# Patient Record
Sex: MALE | Race: WHITE | NOT HISPANIC OR LATINO | Employment: FULL TIME | URBAN - METROPOLITAN AREA
[De-identification: names, ages, dates, MRNs, and addresses within clinical notes are randomized per-mention and may not be internally consistent; named-entity substitution may affect disease eponyms.]

---

## 2024-02-20 ENCOUNTER — HOSPITAL ENCOUNTER (EMERGENCY)
Facility: HOSPITAL | Age: 39
Discharge: HOME | End: 2024-02-20
Attending: STUDENT IN AN ORGANIZED HEALTH CARE EDUCATION/TRAINING PROGRAM
Payer: COMMERCIAL

## 2024-02-20 ENCOUNTER — APPOINTMENT (OUTPATIENT)
Dept: RADIOLOGY | Facility: HOSPITAL | Age: 39
End: 2024-02-20
Payer: COMMERCIAL

## 2024-02-20 ENCOUNTER — APPOINTMENT (OUTPATIENT)
Dept: CARDIOLOGY | Facility: HOSPITAL | Age: 39
End: 2024-02-20
Payer: COMMERCIAL

## 2024-02-20 VITALS
SYSTOLIC BLOOD PRESSURE: 147 MMHG | RESPIRATION RATE: 18 BRPM | HEIGHT: 71 IN | HEART RATE: 76 BPM | BODY MASS INDEX: 28 KG/M2 | TEMPERATURE: 98.2 F | WEIGHT: 200 LBS | DIASTOLIC BLOOD PRESSURE: 96 MMHG | OXYGEN SATURATION: 98 %

## 2024-02-20 DIAGNOSIS — R42 DIZZINESS: Primary | ICD-10-CM

## 2024-02-20 LAB
ALBUMIN SERPL BCP-MCNC: 4.5 G/DL (ref 3.4–5)
ALP SERPL-CCNC: 83 U/L (ref 33–120)
ALT SERPL W P-5'-P-CCNC: 45 U/L (ref 10–52)
ANION GAP SERPL CALC-SCNC: 14 MMOL/L (ref 10–20)
AST SERPL W P-5'-P-CCNC: 20 U/L (ref 9–39)
BASOPHILS # BLD AUTO: 0.03 X10*3/UL (ref 0–0.1)
BASOPHILS NFR BLD AUTO: 0.4 %
BILIRUB SERPL-MCNC: 0.6 MG/DL (ref 0–1.2)
BUN SERPL-MCNC: 17 MG/DL (ref 6–23)
CALCIUM SERPL-MCNC: 10 MG/DL (ref 8.6–10.3)
CARDIAC TROPONIN I PNL SERPL HS: 3 NG/L (ref 0–20)
CHLORIDE SERPL-SCNC: 101 MMOL/L (ref 98–107)
CO2 SERPL-SCNC: 25 MMOL/L (ref 21–32)
CREAT SERPL-MCNC: 0.98 MG/DL (ref 0.5–1.3)
EGFRCR SERPLBLD CKD-EPI 2021: >90 ML/MIN/1.73M*2
EOSINOPHIL # BLD AUTO: 0.04 X10*3/UL (ref 0–0.7)
EOSINOPHIL NFR BLD AUTO: 0.5 %
ERYTHROCYTE [DISTWIDTH] IN BLOOD BY AUTOMATED COUNT: 12.4 % (ref 11.5–14.5)
GLUCOSE SERPL-MCNC: 114 MG/DL (ref 74–99)
HCT VFR BLD AUTO: 49.7 % (ref 41–52)
HGB BLD-MCNC: 17.4 G/DL (ref 13.5–17.5)
IMM GRANULOCYTES # BLD AUTO: 0.03 X10*3/UL (ref 0–0.7)
IMM GRANULOCYTES NFR BLD AUTO: 0.4 % (ref 0–0.9)
LYMPHOCYTES # BLD AUTO: 2.37 X10*3/UL (ref 1.2–4.8)
LYMPHOCYTES NFR BLD AUTO: 30.6 %
MCH RBC QN AUTO: 30.2 PG (ref 26–34)
MCHC RBC AUTO-ENTMCNC: 35 G/DL (ref 32–36)
MCV RBC AUTO: 86 FL (ref 80–100)
MONOCYTES # BLD AUTO: 0.49 X10*3/UL (ref 0.1–1)
MONOCYTES NFR BLD AUTO: 6.3 %
NEUTROPHILS # BLD AUTO: 4.78 X10*3/UL (ref 1.2–7.7)
NEUTROPHILS NFR BLD AUTO: 61.8 %
NRBC BLD-RTO: 0 /100 WBCS (ref 0–0)
PLATELET # BLD AUTO: 291 X10*3/UL (ref 150–450)
POTASSIUM SERPL-SCNC: 4 MMOL/L (ref 3.5–5.3)
PROT SERPL-MCNC: 7.7 G/DL (ref 6.4–8.2)
RBC # BLD AUTO: 5.76 X10*6/UL (ref 4.5–5.9)
SODIUM SERPL-SCNC: 136 MMOL/L (ref 136–145)
WBC # BLD AUTO: 7.7 X10*3/UL (ref 4.4–11.3)

## 2024-02-20 PROCEDURE — 70553 MRI BRAIN STEM W/O & W/DYE: CPT | Performed by: STUDENT IN AN ORGANIZED HEALTH CARE EDUCATION/TRAINING PROGRAM

## 2024-02-20 PROCEDURE — 70544 MR ANGIOGRAPHY HEAD W/O DYE: CPT | Performed by: STUDENT IN AN ORGANIZED HEALTH CARE EDUCATION/TRAINING PROGRAM

## 2024-02-20 PROCEDURE — 70547 MR ANGIOGRAPHY NECK W/O DYE: CPT | Performed by: STUDENT IN AN ORGANIZED HEALTH CARE EDUCATION/TRAINING PROGRAM

## 2024-02-20 PROCEDURE — 84484 ASSAY OF TROPONIN QUANT: CPT

## 2024-02-20 PROCEDURE — 96374 THER/PROPH/DIAG INJ IV PUSH: CPT

## 2024-02-20 PROCEDURE — 70544 MR ANGIOGRAPHY HEAD W/O DYE: CPT | Mod: 59

## 2024-02-20 PROCEDURE — 96361 HYDRATE IV INFUSION ADD-ON: CPT

## 2024-02-20 PROCEDURE — 85025 COMPLETE CBC W/AUTO DIFF WBC: CPT

## 2024-02-20 PROCEDURE — 70551 MRI BRAIN STEM W/O DYE: CPT

## 2024-02-20 PROCEDURE — 2500000001 HC RX 250 WO HCPCS SELF ADMINISTERED DRUGS (ALT 637 FOR MEDICARE OP)

## 2024-02-20 PROCEDURE — 36415 COLL VENOUS BLD VENIPUNCTURE: CPT

## 2024-02-20 PROCEDURE — 99285 EMERGENCY DEPT VISIT HI MDM: CPT | Mod: 25

## 2024-02-20 PROCEDURE — 2500000004 HC RX 250 GENERAL PHARMACY W/ HCPCS (ALT 636 FOR OP/ED)

## 2024-02-20 PROCEDURE — 80053 COMPREHEN METABOLIC PANEL: CPT

## 2024-02-20 PROCEDURE — 70547 MR ANGIOGRAPHY NECK W/O DYE: CPT

## 2024-02-20 PROCEDURE — 93005 ELECTROCARDIOGRAM TRACING: CPT

## 2024-02-20 RX ORDER — SCOLOPAMINE TRANSDERMAL SYSTEM 1 MG/1
1 PATCH, EXTENDED RELEASE TRANSDERMAL
Status: DISCONTINUED | OUTPATIENT
Start: 2024-02-20 | End: 2024-02-21 | Stop reason: HOSPADM

## 2024-02-20 RX ORDER — MECLIZINE HCL 12.5 MG 12.5 MG/1
25 TABLET ORAL ONCE
Status: COMPLETED | OUTPATIENT
Start: 2024-02-20 | End: 2024-02-20

## 2024-02-20 RX ORDER — SCOLOPAMINE TRANSDERMAL SYSTEM 1 MG/1
1 PATCH, EXTENDED RELEASE TRANSDERMAL
Qty: 10 PATCH | Refills: 0 | Status: SHIPPED | OUTPATIENT
Start: 2024-02-20

## 2024-02-20 RX ORDER — ONDANSETRON 4 MG/1
4 TABLET, ORALLY DISINTEGRATING ORAL EVERY 8 HOURS PRN
Qty: 20 TABLET | Refills: 0 | Status: SHIPPED | OUTPATIENT
Start: 2024-02-20 | End: 2024-02-27

## 2024-02-20 RX ORDER — ONDANSETRON HYDROCHLORIDE 2 MG/ML
4 INJECTION, SOLUTION INTRAVENOUS ONCE
Status: COMPLETED | OUTPATIENT
Start: 2024-02-20 | End: 2024-02-20

## 2024-02-20 RX ORDER — MECLIZINE HYDROCHLORIDE 25 MG/1
25 TABLET ORAL 3 TIMES DAILY PRN
Qty: 20 TABLET | Refills: 0 | Status: SHIPPED | OUTPATIENT
Start: 2024-02-20 | End: 2024-02-27

## 2024-02-20 RX ADMIN — MECLIZINE 25 MG: 12.5 TABLET ORAL at 15:43

## 2024-02-20 RX ADMIN — ONDANSETRON 4 MG: 2 INJECTION INTRAMUSCULAR; INTRAVENOUS at 17:43

## 2024-02-20 RX ADMIN — MECLIZINE 25 MG: 12.5 TABLET ORAL at 17:42

## 2024-02-20 RX ADMIN — SCOPOLAMINE 1 PATCH: 1.5 PATCH, EXTENDED RELEASE TRANSDERMAL at 21:05

## 2024-02-20 RX ADMIN — SODIUM CHLORIDE 1000 ML: 9 INJECTION, SOLUTION INTRAVENOUS at 17:32

## 2024-02-20 ASSESSMENT — COLUMBIA-SUICIDE SEVERITY RATING SCALE - C-SSRS
6. HAVE YOU EVER DONE ANYTHING, STARTED TO DO ANYTHING, OR PREPARED TO DO ANYTHING TO END YOUR LIFE?: NO
2. HAVE YOU ACTUALLY HAD ANY THOUGHTS OF KILLING YOURSELF?: NO
1. IN THE PAST MONTH, HAVE YOU WISHED YOU WERE DEAD OR WISHED YOU COULD GO TO SLEEP AND NOT WAKE UP?: NO

## 2024-02-20 ASSESSMENT — PAIN - FUNCTIONAL ASSESSMENT: PAIN_FUNCTIONAL_ASSESSMENT: 0-10

## 2024-02-20 ASSESSMENT — PAIN DESCRIPTION - LOCATION: LOCATION: HEAD

## 2024-02-20 ASSESSMENT — PAIN SCALES - GENERAL: PAINLEVEL_OUTOF10: 3

## 2024-02-20 NOTE — ED PROVIDER NOTES
Chief Complaint   Patient presents with    Dizziness    Dizziness x 2 days; HA       38-year-old male arrives to the emergency department the chief complaint of dizziness.  The patient states on 2/18, at approximately 630 he began having dizziness that was mild at first, the patient states that it was as if his entire world was spinning, and it made him nauseous.  The patient had this throughout the evening with 2 episodes of emesis.  Patient states that the dizziness has now become worse, and it is with position changes and attempts at ambulation.  The patient has no objective focal neurological deficit, the patient has no unilateral weakness, the patient not had any speech difficulties.  The patient has no medical history and does not take any medications on a daily basis.  The patient denies any cold or flulike symptoms recently.  The patient attempted to go to the urgent care, and with his dizziness, they sent him to the emergency department.  The patient is hemodynamically stable, and denies any other complaints or symptoms.      History provided by:  Patient   used: No         PmHx, PsHx, Allergies, Family Hx, social Hx reviewed as documented    A complete 10 point review of systems was performed and is negative except for as mentioned in the HPI.    Physical Exam:    General: Patient is AAOx3, appears well developed, well nourished, is a good historian, answers questions appropriately    HEENT: head normocephalic, atraumatic, PERRLA, EOMs intact, oropharynx without erythema or exudate, buccal mucosa intact without lesions, TMs unremarkable, nose is patent bilateral    Neck: supple, full ROM, negative for lymphadenopathy, JVD, thyromegaly, tracheal deviation, nuccal rigidity    Pulmonary: CTAB, no accessory muscle use, able to speak full clear sentences    Cardiac: HRRR, no murmurs, rubs or gallops    GI: soft, non-tender, non-distended, BS + x 4, no masses or organomegaly, no guarding or  CVA tenderness noted, negative lewis's, mcburney's    Musculoskeletal: full weight bearing, QUINTANA, no joint effusions, clubbing or edema noted    Skin: intact, no lesions or rashes noted, turgor is good.    Neuro: Dizziness on position change, room spinning causing nausea per HPI, otherwise patient patient follow commands, cranial nerves 2-12 grossly intact, motor strengths 5/5 upper and lower extremities, DTR's and sensation are symmetrical. No focal deficits.    Rectal/: No urinary burning, urgency, change in frequency.  Patient has no rectal complaints        Medical Decision Making  This patient was seen, treated, and evaluated in conjunction with Dr. Ruddy West    Primary consideration for this patient would be a benign positional vertigo, the patient's room is spinning, and is also causing the patient to be dizzy.  The patient has no unilateral weakness, no dizziness at rest, the patient's presentation is not consistent with an acute CVA, though it was considered.  Other consideration for the patient would be a blood count deficiency and electrolyte abnormality, diagnostic blood work will be used to further evaluate    At no time does the patient endorse any chest pain or shortness of breath.  The patient given 25 mg of meclizine    The patient's diagnostic blood work is negative for any acute abnormality, on reassessment, the patient continues to endorse nausea, stating that his dizziness is not even a small amount improved, the patient continues to appear miserable, stating that his entire work continues to spin.  The patient feels unsteady with his gait on ambulation to the exam room.    Once in an exam room, I did test the patient for Janny-Hallpike bilaterally with no positive response.  Further consideration given his persistent dizziness would be that he requires additional meclizine, that there possibly is an underlying mass or intracranial abnormality, that this is an atypical ACS response.   High-sensitivity troponin, EKG, CT scan of the head will be used to further evaluate.    The patient's EKG was done on 2/20 at 1738, the EKG was interpreted by attending physician as no STEMI, the EKG is interpreted by me shows a sinus rhythm with a rate of 67, PA interval of 126 and a QTc of 416, there is mild baseline wander appreciated, however no acute ST abnormality is noted    The patient's troponin studies negative, patient given an additional dose of meclizine, and states that he may have noticed a 1% improvement in his dizziness, however the patient remains having an unsteady gait.  Due to resource difficulty of the cat scanner, given the concern of the patient having a posterior stroke, after speaking with the attending physician, the CAT scan is going to be foregone, patient will have an MRI of the brain without IV contrast as well as an MRA of the head and neck without IV contrast to rule out a posterior stroke as well as a dissection, among any other intracranial abnormality.    The patient's MRI as well as MRA are negative for any acute abnormality.  The patient states that he is feeling slightly better with the scopolamine patch, the patient's nausea is completely resolved.    After speaking with the attending physician, the patient is appropriate for outpatient management, with a referral to neurology for any persistent dizziness.  However given the spinning nature of the patient's dizziness which is causing nausea, it is likely a form of vertigo given the negative nature of the remainder of the ED course, though the patient was negative for Senatobia-Hallpike.    Patient given an outpatient prescription of meclizine, scopolamine patches, Zofran and ODT.  Patient is able to walk with a steady gait upon leaving the emergency department.  The patient is in the process of becoming established with a new primary care provider    Patient is amenable to the plan of discharge as outlined above, all patient's  "questions pertaining to their ED course were answered in their entirety.  Strict return precautions were discussed with the patient and they verbalized understanding.  Further, it was made clear to the patient that from an emergent basis, all effort and testing was done to eliminate any imminent dangerous or potentially dangerous conditions of the patient however if their symptoms get much worse or feel life-threatening, they are to return to the emergency department or call 911 immediately.    Amount and/or Complexity of Data Reviewed  Labs: ordered. Decision-making details documented in ED Course.       Diagnoses as of 02/20/24 2251   Dizziness       The patient has had the following imaging during this ER visit: MR BRAIN WO IV CONTRAST  MR ANGIO HEAD WO IV CONTRAST  MR ANGIO NECK WO IV CONTRAST     Patient History   History reviewed. No pertinent past medical history.  Past Surgical History:   Procedure Laterality Date    HERNIA REPAIR       No family history on file.  Social History     Tobacco Use    Smoking status: Former     Types: Cigarettes    Smokeless tobacco: Current     Types: Snuff   Vaping Use    Vaping Use: Never used   Substance Use Topics    Alcohol use: Yes     Comment: occassionally    Drug use: Not Currently       ED Triage Vitals [02/20/24 1344]   Temperature Heart Rate Respirations BP   36.8 °C (98.2 °F) 76 18 (!) 147/96      Pulse Ox Temp src Heart Rate Source Patient Position   98 % -- -- --      BP Location FiO2 (%)     -- --       Vitals:    02/20/24 1344   BP: (!) 147/96   Pulse: 76   Resp: 18   Temp: 36.8 °C (98.2 °F)   SpO2: 98%   Weight: 90.7 kg (200 lb)   Height: 1.803 m (5' 11\")               BECKIE Parmar-CNP  02/20/24 2251    "

## 2024-02-24 LAB
ATRIAL RATE: 68 BPM
P AXIS: 39 DEGREES
PR INTERVAL: 126 MS
Q ONSET: 252 MS
QRS COUNT: 11 BEATS
QRS DURATION: 97 MS
QT INTERVAL: 394 MS
QTC CALCULATION(BAZETT): 416 MS
QTC FREDERICIA: 409 MS
R AXIS: 83 DEGREES
T AXIS: 42 DEGREES
T OFFSET: 449 MS
VENTRICULAR RATE: 67 BPM

## 2024-05-13 ENCOUNTER — PATIENT MESSAGE (OUTPATIENT)
Dept: PRIMARY CARE | Facility: CLINIC | Age: 39
End: 2024-05-13
Payer: COMMERCIAL

## 2024-05-13 DIAGNOSIS — Z13.220 SCREENING FOR HYPERLIPIDEMIA: ICD-10-CM

## 2024-05-13 DIAGNOSIS — Z13.0 SCREENING FOR DEFICIENCY ANEMIA: Primary | ICD-10-CM

## 2024-05-13 DIAGNOSIS — R73.9 HYPERGLYCEMIA: ICD-10-CM

## 2024-05-13 DIAGNOSIS — Z13.89 SCREENING FOR NEPHROPATHY: ICD-10-CM

## 2024-05-13 DIAGNOSIS — E11.9 DIABETES MELLITUS, NEW ONSET (MULTI): ICD-10-CM

## 2024-05-13 DIAGNOSIS — Z13.29 SCREENING FOR THYROID DISORDER: ICD-10-CM

## 2024-05-13 DIAGNOSIS — F41.9 ANXIETY: Primary | ICD-10-CM

## 2024-05-20 ENCOUNTER — LAB (OUTPATIENT)
Dept: LAB | Facility: LAB | Age: 39
End: 2024-05-20
Payer: COMMERCIAL

## 2024-05-20 DIAGNOSIS — Z13.0 SCREENING FOR DEFICIENCY ANEMIA: ICD-10-CM

## 2024-05-20 DIAGNOSIS — R73.9 HYPERGLYCEMIA: ICD-10-CM

## 2024-05-20 DIAGNOSIS — Z13.89 SCREENING FOR NEPHROPATHY: ICD-10-CM

## 2024-05-20 DIAGNOSIS — Z13.29 SCREENING FOR THYROID DISORDER: ICD-10-CM

## 2024-05-20 DIAGNOSIS — Z13.220 SCREENING FOR HYPERLIPIDEMIA: ICD-10-CM

## 2024-05-20 DIAGNOSIS — E11.9 DIABETES MELLITUS, NEW ONSET (MULTI): ICD-10-CM

## 2024-05-20 LAB
25(OH)D3 SERPL-MCNC: 20 NG/ML (ref 30–100)
ALBUMIN SERPL BCP-MCNC: 4.2 G/DL (ref 3.4–5)
ALP SERPL-CCNC: 77 U/L (ref 33–120)
ALT SERPL W P-5'-P-CCNC: 42 U/L (ref 10–52)
ANION GAP SERPL CALC-SCNC: 11 MMOL/L (ref 10–20)
AST SERPL W P-5'-P-CCNC: 20 U/L (ref 9–39)
BILIRUB SERPL-MCNC: 0.3 MG/DL (ref 0–1.2)
BUN SERPL-MCNC: 14 MG/DL (ref 6–23)
CALCIUM SERPL-MCNC: 9.3 MG/DL (ref 8.6–10.3)
CHLORIDE SERPL-SCNC: 103 MMOL/L (ref 98–107)
CHOLEST SERPL-MCNC: 217 MG/DL (ref 0–199)
CHOLESTEROL/HDL RATIO: 4.8
CO2 SERPL-SCNC: 26 MMOL/L (ref 21–32)
CREAT SERPL-MCNC: 0.95 MG/DL (ref 0.5–1.3)
EGFRCR SERPLBLD CKD-EPI 2021: >90 ML/MIN/1.73M*2
ERYTHROCYTE [DISTWIDTH] IN BLOOD BY AUTOMATED COUNT: 12.7 % (ref 11.5–14.5)
GLUCOSE SERPL-MCNC: 195 MG/DL (ref 74–99)
HCT VFR BLD AUTO: 44.9 % (ref 41–52)
HDLC SERPL-MCNC: 44.8 MG/DL
HGB BLD-MCNC: 15.6 G/DL (ref 13.5–17.5)
LDLC SERPL CALC-MCNC: 121 MG/DL
MCH RBC QN AUTO: 30.6 PG (ref 26–34)
MCHC RBC AUTO-ENTMCNC: 34.7 G/DL (ref 32–36)
MCV RBC AUTO: 88 FL (ref 80–100)
NON HDL CHOLESTEROL: 172 MG/DL (ref 0–149)
NRBC BLD-RTO: 0 /100 WBCS (ref 0–0)
PLATELET # BLD AUTO: 267 X10*3/UL (ref 150–450)
POTASSIUM SERPL-SCNC: 4.2 MMOL/L (ref 3.5–5.3)
PROT SERPL-MCNC: 6.7 G/DL (ref 6.4–8.2)
RBC # BLD AUTO: 5.1 X10*6/UL (ref 4.5–5.9)
SODIUM SERPL-SCNC: 136 MMOL/L (ref 136–145)
TRIGL SERPL-MCNC: 255 MG/DL (ref 0–149)
TSH SERPL-ACNC: 2.01 MIU/L (ref 0.44–3.98)
VLDL: 51 MG/DL (ref 0–40)
WBC # BLD AUTO: 5 X10*3/UL (ref 4.4–11.3)

## 2024-05-20 PROCEDURE — 86341 ISLET CELL ANTIBODY: CPT

## 2024-05-20 PROCEDURE — 36415 COLL VENOUS BLD VENIPUNCTURE: CPT

## 2024-05-20 PROCEDURE — 80061 LIPID PANEL: CPT

## 2024-05-20 PROCEDURE — 83036 HEMOGLOBIN GLYCOSYLATED A1C: CPT

## 2024-05-20 PROCEDURE — 83519 RIA NONANTIBODY: CPT

## 2024-05-20 PROCEDURE — 82306 VITAMIN D 25 HYDROXY: CPT

## 2024-05-20 PROCEDURE — 84443 ASSAY THYROID STIM HORMONE: CPT

## 2024-05-20 PROCEDURE — 85027 COMPLETE CBC AUTOMATED: CPT

## 2024-05-20 PROCEDURE — 80053 COMPREHEN METABOLIC PANEL: CPT

## 2024-05-21 ENCOUNTER — OFFICE VISIT (OUTPATIENT)
Dept: PRIMARY CARE | Facility: CLINIC | Age: 39
End: 2024-05-21
Payer: COMMERCIAL

## 2024-05-21 VITALS
TEMPERATURE: 96.8 F | RESPIRATION RATE: 20 BRPM | OXYGEN SATURATION: 96 % | WEIGHT: 205.5 LBS | HEIGHT: 71 IN | BODY MASS INDEX: 28.77 KG/M2 | SYSTOLIC BLOOD PRESSURE: 120 MMHG | DIASTOLIC BLOOD PRESSURE: 76 MMHG | HEART RATE: 67 BPM

## 2024-05-21 DIAGNOSIS — E11.9 DIABETES MELLITUS, NEW ONSET (MULTI): Primary | ICD-10-CM

## 2024-05-21 DIAGNOSIS — Z00.00 HEALTHCARE MAINTENANCE: ICD-10-CM

## 2024-05-21 DIAGNOSIS — L91.8 INFLAMED SKIN TAG: ICD-10-CM

## 2024-05-21 DIAGNOSIS — L91.8 SKIN TAG: ICD-10-CM

## 2024-05-21 PROBLEM — E55.9 VITAMIN D DEFICIENCY: Status: ACTIVE | Noted: 2024-05-21

## 2024-05-21 LAB
EST. AVERAGE GLUCOSE BLD GHB EST-MCNC: 189 MG/DL
HBA1C MFR BLD: 8.2 %

## 2024-05-21 PROCEDURE — 3049F LDL-C 100-129 MG/DL: CPT

## 2024-05-21 PROCEDURE — 3052F HG A1C>EQUAL 8.0%<EQUAL 9.0%: CPT

## 2024-05-21 PROCEDURE — 3078F DIAST BP <80 MM HG: CPT

## 2024-05-21 PROCEDURE — 3074F SYST BP LT 130 MM HG: CPT

## 2024-05-21 PROCEDURE — 99214 OFFICE O/P EST MOD 30 MIN: CPT

## 2024-05-21 PROCEDURE — 99385 PREV VISIT NEW AGE 18-39: CPT

## 2024-05-21 RX ORDER — ROSUVASTATIN CALCIUM 5 MG/1
5 TABLET, COATED ORAL DAILY
Qty: 100 TABLET | Refills: 3 | Status: SHIPPED | OUTPATIENT
Start: 2024-05-21 | End: 2025-06-25

## 2024-05-21 RX ORDER — CHOLECALCIFEROL (VITAMIN D3) 50 MCG
50 TABLET ORAL DAILY
COMMUNITY

## 2024-05-21 SDOH — ECONOMIC STABILITY: FOOD INSECURITY: WITHIN THE PAST 12 MONTHS, YOU WORRIED THAT YOUR FOOD WOULD RUN OUT BEFORE YOU GOT MONEY TO BUY MORE.: NEVER TRUE

## 2024-05-21 SDOH — ECONOMIC STABILITY: FOOD INSECURITY: WITHIN THE PAST 12 MONTHS, THE FOOD YOU BOUGHT JUST DIDN'T LAST AND YOU DIDN'T HAVE MONEY TO GET MORE.: NEVER TRUE

## 2024-05-21 ASSESSMENT — PATIENT HEALTH QUESTIONNAIRE - PHQ9
SUM OF ALL RESPONSES TO PHQ9 QUESTIONS 1 & 2: 0
2. FEELING DOWN, DEPRESSED OR HOPELESS: NOT AT ALL
1. LITTLE INTEREST OR PLEASURE IN DOING THINGS: NOT AT ALL

## 2024-05-21 ASSESSMENT — LIFESTYLE VARIABLES
HOW OFTEN DO YOU HAVE A DRINK CONTAINING ALCOHOL: NEVER
AUDIT-C TOTAL SCORE: 0
HOW MANY STANDARD DRINKS CONTAINING ALCOHOL DO YOU HAVE ON A TYPICAL DAY: PATIENT DOES NOT DRINK
SKIP TO QUESTIONS 9-10: 1
HOW OFTEN DO YOU HAVE SIX OR MORE DRINKS ON ONE OCCASION: NEVER

## 2024-05-21 ASSESSMENT — ENCOUNTER SYMPTOMS
CARDIOVASCULAR NEGATIVE: 1
GASTROINTESTINAL NEGATIVE: 1
ENDOCRINE NEGATIVE: 1
PSYCHIATRIC NEGATIVE: 1
HEMATOLOGIC/LYMPHATIC NEGATIVE: 1
NEUROLOGICAL NEGATIVE: 1
CONSTITUTIONAL NEGATIVE: 1
LOSS OF SENSATION IN FEET: 0
OCCASIONAL FEELINGS OF UNSTEADINESS: 0
MUSCULOSKELETAL NEGATIVE: 1
RESPIRATORY NEGATIVE: 1
DEPRESSION: 0
EYES NEGATIVE: 1

## 2024-05-21 ASSESSMENT — PAIN SCALES - GENERAL: PAINLEVEL: 0-NO PAIN

## 2024-05-21 NOTE — PATIENT INSTRUCTIONS
Thank you for coming to see me today.  If you have any questions or concerns following our visit, please contact the office.  Phone: [phone]    Follow up with me in 3 months or sooner as needed    1)  START metformin XR 500mg 1 tablet daily with breakfast    2) Get fasting labwork a few days prior to next visit.   The lab is down the evans from our office.     3) START vitamin D 2000 units daily    4) START rosuvastatin 5 mg daily to protect blood vessels from heart attack, stroke in setting of higher sugars    5) I am referring you to general surgery - please call [phone] to schedule an appointment or schedule at  on your way out     6) Please bring a copy of your immunization report from [employer] to our office to be scanned into your medical record.

## 2024-05-21 NOTE — PROGRESS NOTES
Subjective   Patient ID: Joni Zhang is a 38 y.o. male who presents for visit to establish care and review labwork.    Reports history of higher glucose readings in the past. Concerned about levels.     Diet: Terrible diet, eating out a lot or making quick pre-made meals at home. Diet tends to be higher in carbs, processed foods. Knows how to cook just little time to do so. Diet coke periodically no routinely sugary beverages.   Exercise: Active at work, coaches softball. Outside doing things but no dedicated exercise routine  Weight: Stable  Water: Drinking good amount of water, works outside  Sleep: Good sleep, getting about 7 hours per night  Social: , 4 children (19, 18 7 and 9 year old). 1 dog  Professional: Works full time as  inside and outside. [employer]  Tobacco: never Alcohol: Occasionally  Illicits: none    Review of Systems   Constitutional: Negative.    HENT: Negative.     Eyes: Negative.    Respiratory: Negative.     Cardiovascular: Negative.    Gastrointestinal: Negative.    Endocrine: Negative.    Genitourinary: Negative.    Musculoskeletal: Negative.    Skin: Negative.    Neurological: Negative.    Hematological: Negative.    Psychiatric/Behavioral: Negative.          Current Outpatient Medications   Medication Sig Dispense Refill    cholecalciferol (Vitamin D-3) 50 MCG (2000 UT) tablet Take 1 tablet (50 mcg) by mouth once daily.      escitalopram (Lexapro) 5 mg tablet Take 1 tablet (5 mg) by mouth once daily. 30 tablet 3    metFORMIN XR (Glucophage-XR) 500 mg 24 hr tablet Take 1 tablet (500 mg) by mouth once daily with breakfast. Do not crush, chew, or split. 100 tablet 3    rosuvastatin (Crestor) 5 mg tablet Take 1 tablet (5 mg) by mouth once daily. 100 tablet 3    scopolamine (Transderm-Scop) 1 mg over 3 days patch 3 day Place 1 patch over 72 hours on the skin every 3rd day. (Patient not taking: Reported on 5/30/2024) 10 patch 0     No current facility-administered  "medications for this visit.     Past Surgical History:   Procedure Laterality Date    HERNIA REPAIR       Family History   Problem Relation Name Age of Onset    Pancreatic cancer Father  55         at age 55    Diabetes Maternal Grandfather        Social History     Tobacco Use    Smoking status: Former     Types: Cigarettes     Passive exposure: Past    Smokeless tobacco: Current     Types: Snuff   Vaping Use    Vaping status: Never Used   Substance Use Topics    Alcohol use: Yes     Comment: occassionally    Drug use: Not Currently        Objective     Visit Vitals  /76 (BP Location: Left arm, Patient Position: Sitting, BP Cuff Size: Adult)   Pulse 67   Temp 36 °C (96.8 °F)   Resp 20   Ht 1.803 m (5' 11\")   Wt 93.2 kg (205 lb 8 oz)   SpO2 96%   BMI 28.66 kg/m²   Smoking Status Former   BSA 2.16 m²        Physical Exam  Constitutional:       Appearance: Normal appearance.   HENT:      Head: Normocephalic and atraumatic.   Eyes:      Extraocular Movements: Extraocular movements intact.      Pupils: Pupils are equal, round, and reactive to light.   Cardiovascular:      Rate and Rhythm: Normal rate and regular rhythm.   Pulmonary:      Effort: Pulmonary effort is normal.      Breath sounds: Normal breath sounds.   Abdominal:      General: Abdomen is flat. Bowel sounds are normal.      Palpations: Abdomen is soft.   Musculoskeletal:         General: Normal range of motion.   Skin:     General: Skin is warm and dry.      Capillary Refill: Capillary refill takes less than 2 seconds.   Neurological:      General: No focal deficit present.      Mental Status: He is alert and oriented to person, place, and time.   Psychiatric:         Mood and Affect: Mood normal.         Behavior: Behavior normal.           Assessment/Plan   Problem List Items Addressed This Visit       Diabetes mellitus, new onset (Multi) - Primary     A1c drawn prior to visit today elevated at 8.2%  Thin body build, no previous history " of DM2 however given his age and body build concern for late onset of DM1 vs DM 1.5    Check MARICEL abs, ZT8 ab, fasting insulin to rule out pancreatic insufficiency  Encouraged to lower carbs/sugars in diet  Start metformin XR 500mg daily with breakfast if abs remain negative         Relevant Medications    rosuvastatin (Crestor) 5 mg tablet    Other Relevant Orders    Glutamic Acid Decarboxylase AB (Completed)    Zinc Transporter 8 Antibody (Completed)    Insulin, Fasting    Hemoglobin A1C    Albumin , Urine Random    Basic Metabolic Panel    Vitamin D 25-Hydroxy,Total (for eval of Vitamin D levels)    Healthcare maintenance     - Healthy diet, good quality and duration of sleep, healthy water intake, regular exercise and healthy weight discussed  Vaccines   Flu: Recommended annually  Tdap: Last in 11/2009, deferred  -Following with dentistry and optometry regularly  -No symptoms of depression/concerns for anxiety  -Feeling safe at home  -Skin cancer prevention          Inflamed skin tag     Referral to general surgery for large skin tag of right thigh          Other Visit Diagnoses       Skin tag        Relevant Orders    Referral to General Surgery            All pertinent lab work and results were reviewed with patient.     Follow up with me in 3 months     BECKIE Martinez-CNS

## 2024-05-22 RX ORDER — ESCITALOPRAM OXALATE 5 MG/1
5 TABLET ORAL DAILY
Qty: 30 TABLET | Refills: 3 | Status: SHIPPED | OUTPATIENT
Start: 2024-05-22

## 2024-05-23 RX ORDER — METFORMIN HYDROCHLORIDE 500 MG/1
500 TABLET, EXTENDED RELEASE ORAL
Qty: 100 TABLET | Refills: 3 | Status: SHIPPED | OUTPATIENT
Start: 2024-05-23 | End: 2025-06-27

## 2024-05-25 LAB — ZNT8 AB SERPL IA-ACNC: <10 U/ML (ref 0–15)

## 2024-05-26 LAB — GAD65 AB SER IA-ACNC: <5 IU/ML (ref 0–5)

## 2024-05-30 ENCOUNTER — OFFICE VISIT (OUTPATIENT)
Dept: SURGERY | Facility: CLINIC | Age: 39
End: 2024-05-30
Payer: COMMERCIAL

## 2024-05-30 VITALS
WEIGHT: 206.2 LBS | DIASTOLIC BLOOD PRESSURE: 84 MMHG | HEART RATE: 70 BPM | OXYGEN SATURATION: 96 % | SYSTOLIC BLOOD PRESSURE: 125 MMHG | HEIGHT: 71 IN | BODY MASS INDEX: 28.87 KG/M2

## 2024-05-30 DIAGNOSIS — L91.8 SKIN TAG: ICD-10-CM

## 2024-05-30 PROCEDURE — 3079F DIAST BP 80-89 MM HG: CPT | Performed by: SURGERY

## 2024-05-30 PROCEDURE — 3074F SYST BP LT 130 MM HG: CPT | Performed by: SURGERY

## 2024-05-30 PROCEDURE — 99202 OFFICE O/P NEW SF 15 MIN: CPT | Performed by: SURGERY

## 2024-05-30 PROCEDURE — 3052F HG A1C>EQUAL 8.0%<EQUAL 9.0%: CPT | Performed by: SURGERY

## 2024-05-30 PROCEDURE — 3049F LDL-C 100-129 MG/DL: CPT | Performed by: SURGERY

## 2024-05-30 NOTE — PROGRESS NOTES
"Subjective   Patient ID: Joni Zhang is a 38 y.o. male who presents for New Patient Visit (Patient was referred by TAYLOR Conway for a skin tag on right thigh. Patient states that it has been there for about a year. Patient denies pain or discomfort.).    Chief Complaint   Patient presents with    New Patient Visit     Patient was referred by TAYLOR Conway for a skin tag on right thigh. Patient states that it has been there for about a year. Patient denies pain or discomfort.     Skin tag 1 years  Panc can dec father  History of Presenting Illness:  38M with \"skin tag\" on his inner R thigh for the past 1 year.  He denies drainage, bleeding, skin changes but has been slowly increasing in size and causing some discomfort.  He denies any skin abnormalities elsewhere on his body.  He denies dysuria, fevers/chills, rash elsewhere.      Review of Systems:  Constitutional: Denies changes in weight, fatigue, night-sweats  HEENT: No changes in vision, nasal drainage, headache  CV: No palpitations, no chest pain, no lower extremity oedema  Resp: No wheezing, no cough, no shortness of breath  GI: No nausea, vomiting, diarrhoea, constipation  : No dysuria, no increased frequency  Neuro: No weakness, confusion, numbness, dizziness  MSK: No weakness, arthralgias, myalgias  Haem: No easy bruising, no easy bleeding  Skin: No new lesions or rashes  Endocrine: No polydipsia, polyuria, heat/cold insensitivity    History reviewed. No pertinent past medical history.    Past Surgical History:   Procedure Laterality Date    HERNIA REPAIR  1987       Current Outpatient Medications on File Prior to Visit   Medication Sig Dispense Refill    cholecalciferol (Vitamin D-3) 50 MCG (2000 UT) tablet Take 1 tablet (50 mcg) by mouth once daily.      escitalopram (Lexapro) 5 mg tablet Take 1 tablet (5 mg) by mouth once daily. 30 tablet 3    metFORMIN XR (Glucophage-XR) 500 mg 24 hr tablet Take 1 tablet (500 mg) by mouth once " "daily with breakfast. Do not crush, chew, or split. 100 tablet 3    rosuvastatin (Crestor) 5 mg tablet Take 1 tablet (5 mg) by mouth once daily. 100 tablet 3    scopolamine (Transderm-Scop) 1 mg over 3 days patch 3 day Place 1 patch over 72 hours on the skin every 3rd day. (Patient not taking: Reported on 2024) 10 patch 0     No current facility-administered medications on file prior to visit.        No Known Allergies    Social History     Socioeconomic History    Marital status:      Spouse name: Not on file    Number of children: Not on file    Years of education: Not on file    Highest education level: Not on file   Occupational History    Not on file   Tobacco Use    Smoking status: Former     Types: Cigarettes     Passive exposure: Past    Smokeless tobacco: Current     Types: Snuff   Vaping Use    Vaping status: Never Used   Substance and Sexual Activity    Alcohol use: Yes     Comment: occassionally    Drug use: Not Currently    Sexual activity: Not on file   Other Topics Concern    Not on file   Social History Narrative    Not on file     Social Determinants of Health     Financial Resource Strain: Not on file   Food Insecurity: No Food Insecurity (2024)    Hunger Vital Sign     Worried About Running Out of Food in the Last Year: Never true     Ran Out of Food in the Last Year: Never true   Transportation Needs: Not on file   Physical Activity: Not on file   Stress: Not on file   Social Connections: Not on file   Intimate Partner Violence: Not on file   Housing Stability: Not on file       Family History   Problem Relation Name Age of Onset    Pancreatic cancer Father  55         at age 55    Diabetes Maternal Grandfather         Objective   /84   Pulse 70   Ht 1.803 m (5' 11\")   Wt 93.5 kg (206 lb 3.2 oz)   SpO2 96%   BMI 28.76 kg/m²   Physical Exam  Vitals reviewed.   Cardiovascular:      Rate and Rhythm: Normal rate.   Pulmonary:      Effort: Pulmonary effort is " normal.   Genitourinary:     Comments: Pedunculated lesion measuring approx 4-5cm with broad base stalk at medial inner R thigh, no skin rash, non-tender  Skin:     General: Skin is warm.      Capillary Refill: Capillary refill takes less than 2 seconds.   Neurological:      General: No focal deficit present.      Mental Status: He is alert.   Psychiatric:         Mood and Affect: Mood normal.         Assessment/Plan   38M with pedunculated skin tag on inner R thigh  - will schedule for elective excision in procedure room at later date  Problem List Items Addressed This Visit    None  Visit Diagnoses         Codes    Skin tag     L91.8                 Namita Davis MD 05/30/24 9:53 AM

## 2024-06-01 NOTE — ASSESSMENT & PLAN NOTE
A1c drawn prior to visit today elevated at 8.2%  Thin body build, no previous history of DM2 however given his age and body build concern for late onset of DM1 vs DM 1.5    Check MARICEL abs, ZT8 ab, fasting insulin to rule out pancreatic insufficiency  Encouraged to lower carbs/sugars in diet  Start metformin XR 500mg daily with breakfast if abs remain negative

## 2024-06-01 NOTE — ASSESSMENT & PLAN NOTE
- Healthy diet, good quality and duration of sleep, healthy water intake, regular exercise and healthy weight discussed  Vaccines   Flu: Recommended annually  Tdap: Last in 11/2009, deferred  -Following with dentistry and optometry regularly  -No symptoms of depression/concerns for anxiety  -Feeling safe at home  -Skin cancer prevention

## 2024-06-04 ENCOUNTER — PROCEDURE VISIT (OUTPATIENT)
Dept: SURGERY | Facility: CLINIC | Age: 39
End: 2024-06-04
Payer: COMMERCIAL

## 2024-06-04 VITALS
BODY MASS INDEX: 28.42 KG/M2 | DIASTOLIC BLOOD PRESSURE: 82 MMHG | WEIGHT: 203 LBS | SYSTOLIC BLOOD PRESSURE: 119 MMHG | OXYGEN SATURATION: 96 % | HEART RATE: 67 BPM | HEIGHT: 71 IN

## 2024-06-04 DIAGNOSIS — R22.9 SKIN MASS: Primary | ICD-10-CM

## 2024-06-04 PROCEDURE — 11402 EXC TR-EXT B9+MARG 1.1-2 CM: CPT | Performed by: SURGERY

## 2024-06-04 RX ORDER — SULFAMETHOXAZOLE AND TRIMETHOPRIM 800; 160 MG/1; MG/1
1 TABLET ORAL 2 TIMES DAILY
Qty: 10 TABLET | Refills: 0 | Status: SHIPPED | OUTPATIENT
Start: 2024-06-04 | End: 2024-06-09

## 2024-06-04 NOTE — PROGRESS NOTES
"Subjective   Patient ID: Joni Zhang is a 38 y.o. male who presents for Follow-up (Patient is here for a right thigh skin tag removal. Patient states no new or worsening symptoms. ).    HPI   is here for excision of a R groin skin tag.  He denies changes since the initial visit.      Review of Systems  Constitutional: Denies changes in weight, fatigue, night-sweats  HEENT: No changes in vision, nasal drainage, headache  CV: No palpitations, no chest pain, no lower extremity oedema  Resp: No wheezing, no cough, no shortness of breath  GI: No nausea, vomiting, diarrhoea, constipation  : No dysuria, no increased frequency  Neuro: No weakness, confusion, numbness, dizziness  MSK: No weakness, arthralgias, myalgias  Haem: No easy bruising, no easy bleeding  Skin: No new lesions or rashes  Endocrine: No polydipsia, polyuria, heat/cold insensitivity      Objective   /82   Pulse 67   Ht 1.803 m (5' 11\")   Wt 92.1 kg (203 lb)   SpO2 96%   BMI 28.31 kg/m²   Physical Exam  Physical Exam  Vitals reviewed.   Cardiovascular:      Rate and Rhythm: Normal rate.   Pulmonary:      Effort: Pulmonary effort is normal.   Genitourinary:     Comments: Pedunculated lesion measuring approx 4-5cm with broad base stalk at medial inner R thigh, no skin rash, non-tender  Skin:     General: Skin is warm.      Capillary Refill: Capillary refill takes less than 2 seconds.   Neurological:      General: No focal deficit present.      Mental Status: He is alert.   Psychiatric:         Mood and Affect: Mood normal.     Excision of RIGHT inner thigh/groin mass    Date/Time: 6/4/2024 10:28 AM    Performed by: Namita Davis MD  Authorized by: Namita Davis MD    Consent:     Consent obtained:  Verbal    Consent given by:  Patient    Risks, benefits, and alternatives were discussed: yes      Risks discussed:  Bleeding, infection and pain    Alternatives discussed:  No treatment and observation  Universal " protocol:     Procedure explained and questions answered to patient or proxy's satisfaction: yes      Relevant documents present and verified: yes      Patient identity confirmed:  Verbally with patient  Indications:     Indications:  38M with slowly enlarging and symptomatic RIGHT inner thigh/groin mass  Pre-procedure details:     Skin preparation:  Povidone-iodine    Preparation: Patient was prepped and draped in the usual sterile fashion    Sedation:     Sedation type:  None  Anesthesia:     Anesthesia method:  Local infiltration    Local anesthetic:  Lidocaine 1% WITH epi  Procedure specific details:      The area was prepped and draped in the usual sterile fashion. Local anesthesia was administered using 5cc of 1% lidocaine with epinephrine. A 1.5 elliptical cm incision was made incorporating the base of the mass.  The mass was excised off the skin at the subcutaneous level.  The mass was sent off to pathology.  Pressure was help on the base of the wound to help with haemostasis.  Once that was accomplished, 2 #4-0 nylon vertical mattress sutures were used to close the wound.  It was then dressed with a bandaid.  The patient tolerated the procedure well.  Post-procedure details:     Procedure completion:  Tolerated well, no immediate complications        Assessment/Plan   38M with RIGHT inner thigh/groin mass s/p excisional biopsy  - follow-up 2 weeks for suture removal, wound check, and discuss pathology  - empiric Bactrim PO Abx coverage           Namita Davis MD 06/04/24 10:25 AM

## 2024-06-13 LAB
LABORATORY COMMENT REPORT: NORMAL
PATH REPORT.FINAL DX SPEC: NORMAL
PATH REPORT.GROSS SPEC: NORMAL
PATH REPORT.RELEVANT HX SPEC: NORMAL
PATH REPORT.TOTAL CANCER: NORMAL

## 2024-06-18 ENCOUNTER — APPOINTMENT (OUTPATIENT)
Dept: SURGERY | Facility: CLINIC | Age: 39
End: 2024-06-18
Payer: COMMERCIAL

## 2024-06-18 ENCOUNTER — OFFICE VISIT (OUTPATIENT)
Dept: SURGERY | Facility: CLINIC | Age: 39
End: 2024-06-18
Payer: COMMERCIAL

## 2024-06-18 VITALS
SYSTOLIC BLOOD PRESSURE: 127 MMHG | WEIGHT: 208 LBS | BODY MASS INDEX: 29.12 KG/M2 | OXYGEN SATURATION: 95 % | HEIGHT: 71 IN | DIASTOLIC BLOOD PRESSURE: 86 MMHG | HEART RATE: 69 BPM

## 2024-06-18 DIAGNOSIS — R22.9 SKIN MASS: Primary | ICD-10-CM

## 2024-06-18 PROCEDURE — 3052F HG A1C>EQUAL 8.0%<EQUAL 9.0%: CPT | Performed by: SURGERY

## 2024-06-18 PROCEDURE — 99212 OFFICE O/P EST SF 10 MIN: CPT | Performed by: SURGERY

## 2024-06-18 PROCEDURE — 3049F LDL-C 100-129 MG/DL: CPT | Performed by: SURGERY

## 2024-06-18 PROCEDURE — 3074F SYST BP LT 130 MM HG: CPT | Performed by: SURGERY

## 2024-06-18 PROCEDURE — 3079F DIAST BP 80-89 MM HG: CPT | Performed by: SURGERY

## 2024-06-18 NOTE — PROGRESS NOTES
"Subjective   Patient ID: Joni Zhang is a 38 y.o. male who presents for Follow-up (Patient is here for a 2 week follow up right thigh skin tag removal. Patient states that he is healing well.).    HPI   had a skin tag removed in the office on 4th June 2024.  He is here for evaluation of the wound and removal of sutures.  He denies complaints from the excision site.      Review of Systems  Constitutional: Denies changes in weight, fatigue, night-sweats  HEENT: No changes in vision, nasal drainage, headache  CV: No palpitations, no chest pain, no lower extremity oedema  Resp: No wheezing, no cough, no shortness of breath  GI: No nausea, vomiting, diarrhoea, constipation  : No dysuria, no increased frequency  Neuro: No weakness, confusion, numbness, dizziness  MSK: No weakness, arthralgias, myalgias  Haem: No easy bruising, no easy bleeding  Skin: No new lesions or rashes  Endocrine: No polydipsia, polyuria, heat/cold insensitivity      Objective   /86   Pulse 69   Ht 1.803 m (5' 11\")   Wt 94.3 kg (208 lb)   SpO2 95%   BMI 29.01 kg/m²     Physical Exam  Vitals reviewed.   Cardiovascular:      Rate and Rhythm: Normal rate.   Pulmonary:      Effort: Pulmonary effort is normal.   Genitourinary:     Comments: R groin incision approximated  Sutures removed - minimal oozing   Skin:     General: Skin is warm.      Capillary Refill: Capillary refill takes less than 2 seconds.   Neurological:      General: No focal deficit present.      Mental Status: He is alert.   Psychiatric:         Mood and Affect: Mood normal.       Pathology:  Component    FINAL DIAGNOSIS   A. SKIN, RIGHT INNER THIGH/GROIN, EXCISION:  --PEDUNCULATED LIPOFIBROMA      Electronically signed by Laila Patel MD on 6/13/2024 at 1428       Assessment/Plan   38M s/p skin mass removal with benign pathology  - discussed path = benign  - follow-up PRN  Problem List Items Addressed This Visit    None  Visit Diagnoses         Codes    " Skin mass    -  Primary R22.9                 Namita Davis MD 06/18/24 2:46 PM

## 2024-09-06 ENCOUNTER — APPOINTMENT (OUTPATIENT)
Dept: PRIMARY CARE | Facility: CLINIC | Age: 39
End: 2024-09-06
Payer: COMMERCIAL

## 2024-11-22 ENCOUNTER — LAB (OUTPATIENT)
Dept: LAB | Facility: LAB | Age: 39
End: 2024-11-22
Payer: COMMERCIAL

## 2024-11-22 ENCOUNTER — APPOINTMENT (OUTPATIENT)
Dept: PRIMARY CARE | Facility: CLINIC | Age: 39
End: 2024-11-22
Payer: COMMERCIAL

## 2024-11-22 VITALS
OXYGEN SATURATION: 96 % | BODY MASS INDEX: 28.07 KG/M2 | HEIGHT: 71 IN | SYSTOLIC BLOOD PRESSURE: 118 MMHG | TEMPERATURE: 96.9 F | WEIGHT: 200.5 LBS | DIASTOLIC BLOOD PRESSURE: 80 MMHG | HEART RATE: 88 BPM | RESPIRATION RATE: 20 BRPM

## 2024-11-22 DIAGNOSIS — E55.9 VITAMIN D DEFICIENCY: ICD-10-CM

## 2024-11-22 DIAGNOSIS — E11.9 DIABETES MELLITUS, NEW ONSET: ICD-10-CM

## 2024-11-22 DIAGNOSIS — Z13.29 SCREENING FOR THYROID DISORDER: ICD-10-CM

## 2024-11-22 DIAGNOSIS — Z23 NEED FOR INFLUENZA VACCINATION: Primary | ICD-10-CM

## 2024-11-22 LAB
25(OH)D3 SERPL-MCNC: 22 NG/ML (ref 30–100)
ANION GAP SERPL CALC-SCNC: 11 MMOL/L (ref 10–20)
BUN SERPL-MCNC: 18 MG/DL (ref 6–23)
CALCIUM SERPL-MCNC: 9.3 MG/DL (ref 8.6–10.3)
CHLORIDE SERPL-SCNC: 103 MMOL/L (ref 98–107)
CO2 SERPL-SCNC: 29 MMOL/L (ref 21–32)
CREAT SERPL-MCNC: 1.01 MG/DL (ref 0.5–1.3)
EGFRCR SERPLBLD CKD-EPI 2021: >90 ML/MIN/1.73M*2
GLUCOSE SERPL-MCNC: 120 MG/DL (ref 74–99)
INSULIN P FAST SERPL-ACNC: 12 UIU/ML (ref 3–25)
POTASSIUM SERPL-SCNC: 4.5 MMOL/L (ref 3.5–5.3)
SODIUM SERPL-SCNC: 138 MMOL/L (ref 136–145)

## 2024-11-22 PROCEDURE — 36415 COLL VENOUS BLD VENIPUNCTURE: CPT

## 2024-11-22 PROCEDURE — 80048 BASIC METABOLIC PNL TOTAL CA: CPT

## 2024-11-22 PROCEDURE — 3008F BODY MASS INDEX DOCD: CPT

## 2024-11-22 PROCEDURE — 83525 ASSAY OF INSULIN: CPT

## 2024-11-22 PROCEDURE — 3049F LDL-C 100-129 MG/DL: CPT

## 2024-11-22 PROCEDURE — 83036 HEMOGLOBIN GLYCOSYLATED A1C: CPT

## 2024-11-22 PROCEDURE — 3051F HG A1C>EQUAL 7.0%<8.0%: CPT

## 2024-11-22 PROCEDURE — 3074F SYST BP LT 130 MM HG: CPT

## 2024-11-22 PROCEDURE — 82306 VITAMIN D 25 HYDROXY: CPT

## 2024-11-22 PROCEDURE — 90656 IIV3 VACC NO PRSV 0.5 ML IM: CPT

## 2024-11-22 PROCEDURE — 3079F DIAST BP 80-89 MM HG: CPT

## 2024-11-22 PROCEDURE — 99213 OFFICE O/P EST LOW 20 MIN: CPT

## 2024-11-22 PROCEDURE — 90471 IMMUNIZATION ADMIN: CPT

## 2024-11-22 RX ORDER — ESCITALOPRAM OXALATE 10 MG/1
10 TABLET ORAL DAILY
COMMUNITY
Start: 2024-10-29

## 2024-11-22 RX ORDER — NALTREXONE HYDROCHLORIDE 50 MG/1
50 TABLET, FILM COATED ORAL DAILY
COMMUNITY
Start: 2024-10-29

## 2024-11-22 SDOH — ECONOMIC STABILITY: FOOD INSECURITY: WITHIN THE PAST 12 MONTHS, YOU WORRIED THAT YOUR FOOD WOULD RUN OUT BEFORE YOU GOT MONEY TO BUY MORE.: NEVER TRUE

## 2024-11-22 SDOH — ECONOMIC STABILITY: FOOD INSECURITY: WITHIN THE PAST 12 MONTHS, THE FOOD YOU BOUGHT JUST DIDN'T LAST AND YOU DIDN'T HAVE MONEY TO GET MORE.: NEVER TRUE

## 2024-11-22 ASSESSMENT — ENCOUNTER SYMPTOMS
DEPRESSION: 0
DIZZINESS: 0
MUSCULOSKELETAL NEGATIVE: 1
VISUAL CHANGE: 0
NERVOUS/ANXIOUS: 0
TREMORS: 0
HEADACHES: 0
BLURRED VISION: 0
WEAKNESS: 0
POLYDIPSIA: 0
EYES NEGATIVE: 1
SEIZURES: 0
SPEECH DIFFICULTY: 0
CONFUSION: 0
RESPIRATORY NEGATIVE: 1
OCCASIONAL FEELINGS OF UNSTEADINESS: 0
CONSTITUTIONAL NEGATIVE: 1
NEUROLOGICAL NEGATIVE: 1
GASTROINTESTINAL NEGATIVE: 1
CARDIOVASCULAR NEGATIVE: 1
BLACKOUTS: 0
ENDOCRINE NEGATIVE: 1
FATIGUE: 0
WEIGHT LOSS: 0
HUNGER: 0
SWEATS: 0
LOSS OF SENSATION IN FEET: 0
HEMATOLOGIC/LYMPHATIC NEGATIVE: 1
PSYCHIATRIC NEGATIVE: 1

## 2024-11-22 ASSESSMENT — ANXIETY QUESTIONNAIRES
7. FEELING AFRAID AS IF SOMETHING AWFUL MIGHT HAPPEN: NOT AT ALL
3. WORRYING TOO MUCH ABOUT DIFFERENT THINGS: NOT AT ALL
GAD7 TOTAL SCORE: 0
5. BEING SO RESTLESS THAT IT IS HARD TO SIT STILL: NOT AT ALL
IF YOU CHECKED OFF ANY PROBLEMS ON THIS QUESTIONNAIRE, HOW DIFFICULT HAVE THESE PROBLEMS MADE IT FOR YOU TO DO YOUR WORK, TAKE CARE OF THINGS AT HOME, OR GET ALONG WITH OTHER PEOPLE: NOT DIFFICULT AT ALL
6. BECOMING EASILY ANNOYED OR IRRITABLE: NOT AT ALL
4. TROUBLE RELAXING: NOT AT ALL
1. FEELING NERVOUS, ANXIOUS, OR ON EDGE: NOT AT ALL
2. NOT BEING ABLE TO STOP OR CONTROL WORRYING: NOT AT ALL

## 2024-11-22 ASSESSMENT — PATIENT HEALTH QUESTIONNAIRE - PHQ9
2. FEELING DOWN, DEPRESSED OR HOPELESS: NOT AT ALL
1. LITTLE INTEREST OR PLEASURE IN DOING THINGS: NOT AT ALL
SUM OF ALL RESPONSES TO PHQ9 QUESTIONS 1 & 2: 0

## 2024-11-22 ASSESSMENT — PAIN SCALES - GENERAL: PAINLEVEL_OUTOF10: 0-NO PAIN

## 2024-11-22 ASSESSMENT — LIFESTYLE VARIABLES
SKIP TO QUESTIONS 9-10: 1
HOW OFTEN DO YOU HAVE A DRINK CONTAINING ALCOHOL: NEVER
AUDIT-C TOTAL SCORE: 0
HOW MANY STANDARD DRINKS CONTAINING ALCOHOL DO YOU HAVE ON A TYPICAL DAY: PATIENT DOES NOT DRINK
HOW OFTEN DO YOU HAVE SIX OR MORE DRINKS ON ONE OCCASION: NEVER

## 2024-11-22 NOTE — PROGRESS NOTES
Subjective   Patient ID: Joni Zhang is a 39 y.o. male who presents for follow up of DM2.    Had labs done prior to visit this morning, A1c still in process.    Answers submitted by the patient for this visit:  Diabetes Questionnaire (Submitted on 11/22/2024)  Chief Complaint: Diabetes problem  Diabetes type: type 2  MedicAlert ID: No  Disease duration: 4 Months  blurred vision: No  foot paresthesias: No  foot ulcerations: No  visual change: No  weight loss: No  Symptom course: improving  hunger: No  mood changes: No  sleepiness: No  sweats: No  blackouts: No  hospitalization: No  nocturnal hypoglycemia: No  required assistance: No  required glucagon: No  CVA: No  heart disease: No  nephropathy: No  peripheral neuropathy: No  PVD: No  retinopathy: No  CAD risks: no known risk factors  Current treatments: oral agent (dual therapy)  Treatment compliance: most of the time  Home blood tests: 3-4 x per day  Home urines: <1 x per month  Monitoring compliance: good  Blood glucose trend: decreasing steadily  breakfast time: 9-10 am  breakfast glucose level: 110-130  lunch time: after 3 pm  dinner time: 7-8 pm  dinner glucose level: 180-200  Bedtime: 9-10 pm  Bedtime glucose level: 140-180  High score: 180-200  Overall: 130-140  Weight trend: stable  Current diet: generally unhealthy  Meal planning: none  Exercise: weekly  Dietitian visit: No  Eye exam current: Yes  Sees podiatrist: No      Diet: Terrible diet, eating out a lot or making quick pre-made meals at home. Diet tends to be higher in carbs, processed foods. Knows how to cook just little time to do so. Diet coke periodically no routinely sugary beverages.   Exercise: Active at work, coaches softball. Outside doing things but no dedicated exercise routine  Weight: Stable  Water: Drinking good amount of water, works outside  Sleep: Good sleep, getting about 7 hours per night  Social: , 4 children (19, 18 7 and 9 year old). 1 dog  Professional: Works full  time as  inside and outside. [employer]  Tobacco: never Alcohol: Occasionally  Illicits: none    Review of Systems   Constitutional: Negative.  Negative for fatigue.   HENT: Negative.     Eyes: Negative.    Respiratory: Negative.     Cardiovascular: Negative.  Negative for chest pain.   Gastrointestinal: Negative.    Endocrine: Negative.  Negative for polydipsia and polyuria.   Genitourinary: Negative.    Musculoskeletal: Negative.    Skin: Negative.  Negative for pallor.   Neurological: Negative.  Negative for dizziness, tremors, seizures, speech difficulty, weakness and headaches.   Hematological: Negative.    Psychiatric/Behavioral: Negative.  Negative for confusion. The patient is not nervous/anxious.         Current Outpatient Medications   Medication Sig Dispense Refill    cholecalciferol (Vitamin D-3) 50 MCG (2000 UT) tablet Take 1 tablet (50 mcg) by mouth once daily.      escitalopram (Lexapro) 10 mg tablet Take 1 tablet (10 mg) by mouth once daily.      naltrexone (Depade) 50 mg tablet Take 1 tablet (50 mg) by mouth once daily.      rosuvastatin (Crestor) 5 mg tablet Take 1 tablet (5 mg) by mouth once daily. 100 tablet 3    metFORMIN XR (Glucophage-XR) 500 mg 24 hr tablet Take 1 tablet (500 mg) by mouth 2 times daily (morning and late afternoon). Do not crush, chew, or split. 180 tablet 3     No current facility-administered medications for this visit.     Past Surgical History:   Procedure Laterality Date    HERNIA REPAIR       Family History   Problem Relation Name Age of Onset    Pancreatic cancer Father  55         at age 55    Diabetes Maternal Grandfather        Social History     Tobacco Use    Smoking status: Former     Types: Cigarettes     Passive exposure: Past    Smokeless tobacco: Former     Types: Snuff   Vaping Use    Vaping status: Never Used   Substance Use Topics    Alcohol use: Not Currently     Comment: occassionally    Drug use: Not Currently        Objective  "    Visit Vitals  /80 (BP Location: Right arm, Patient Position: Sitting, BP Cuff Size: Large adult)   Pulse 88   Temp 36.1 °C (96.9 °F) (Temporal)   Resp 20   Ht 1.803 m (5' 11\")   Wt 90.9 kg (200 lb 8 oz)   SpO2 96%   BMI 27.96 kg/m²   Smoking Status Former   BSA 2.13 m²        Physical Exam  Constitutional:       Appearance: Normal appearance.   HENT:      Head: Normocephalic and atraumatic.   Eyes:      Extraocular Movements: Extraocular movements intact.      Pupils: Pupils are equal, round, and reactive to light.   Pulmonary:      Effort: Pulmonary effort is normal.   Musculoskeletal:         General: Normal range of motion.   Skin:     General: Skin is warm and dry.      Capillary Refill: Capillary refill takes less than 2 seconds.   Neurological:      General: No focal deficit present.      Mental Status: He is alert and oriented to person, place, and time.   Psychiatric:         Mood and Affect: Mood normal.         Behavior: Behavior normal.           Assessment/Plan   Problem List Items Addressed This Visit       Diabetes mellitus, new onset     A1c remains in process, fasting glucose of 120 on labs from today    Continue metformin XR 500mg daily  Repeat A1c prior to next visit  Continue with healthy diet/lifestyle    **After visit labs resulted with A1c of 7.7%  Increase metformin XR to 500mg twice daily         Relevant Medications    metFORMIN XR (Glucophage-XR) 500 mg 24 hr tablet    Other Relevant Orders    Hemoglobin A1C    CBC    Lipid Panel    Comprehensive Metabolic Panel    Vitamin D deficiency     Vitamin D remains low at 22 on labs from 11/2024    Advised to start over-the-counter 2000 units vitamin D daily         Relevant Orders    Vitamin D 25-Hydroxy,Total (for eval of Vitamin D levels)     Other Visit Diagnoses       Need for influenza vaccination    -  Primary    Relevant Orders    Flu vaccine, trivalent, preservative free, age 6 months and greater (Fluarix/Fluzone/Flulaval) " (Completed)    Screening for thyroid disorder        Relevant Orders    TSH with reflex to Free T4 if abnormal            All pertinent lab work and results were reviewed with patient.     Follow up with me in 6 months     Tika Lancaster, B[facility]

## 2024-11-22 NOTE — ASSESSMENT & PLAN NOTE
A1c remains in process, fasting glucose of 120 on labs from today    Continue metformin XR 500mg daily  Repeat A1c prior to next visit  Continue with healthy diet/lifestyle    **After visit labs resulted with A1c of 7.7%  Increase metformin XR to 500mg twice daily

## 2024-11-22 NOTE — PATIENT INSTRUCTIONS
Thank you for coming to see me today.  If you have any questions or concerns following our visit, please contact the office.  Phone: [phone]    Follow up with me in 6 months for annual wellness visit    1)  START vitamin D 2000 units daily- must purchase this over the counter    2) Get fasting labwork a few days prior to next visit.  The lab is down the [place] from our office.

## 2024-11-23 LAB
EST. AVERAGE GLUCOSE BLD GHB EST-MCNC: 174 MG/DL
HBA1C MFR BLD: 7.7 %

## 2024-11-25 ENCOUNTER — PATIENT MESSAGE (OUTPATIENT)
Dept: PRIMARY CARE | Facility: CLINIC | Age: 39
End: 2024-11-25
Payer: COMMERCIAL

## 2024-11-26 RX ORDER — METFORMIN HYDROCHLORIDE 500 MG/1
500 TABLET, EXTENDED RELEASE ORAL
Qty: 180 TABLET | Refills: 3 | Status: SHIPPED | OUTPATIENT
Start: 2024-11-26 | End: 2025-12-31

## 2024-11-26 NOTE — ASSESSMENT & PLAN NOTE
Vitamin D remains low at 22 on labs from 11/2024    Advised to start over-the-counter 2000 units vitamin D daily

## 2025-01-08 ENCOUNTER — TELEPHONE (OUTPATIENT)
Dept: PRIMARY CARE | Facility: CLINIC | Age: 40
End: 2025-01-08
Payer: COMMERCIAL

## 2025-01-08 DIAGNOSIS — F41.9 ANXIETY: Primary | ICD-10-CM

## 2025-01-08 RX ORDER — ESCITALOPRAM OXALATE 10 MG/1
10 TABLET ORAL DAILY
Qty: 90 TABLET | Refills: 2 | Status: SHIPPED | OUTPATIENT
Start: 2025-01-08

## 2025-01-08 NOTE — TELEPHONE ENCOUNTER
Patients wife called in requesting a new script for his Escitalopram (Lexapro) 10 mg tablet. Patients wife would like this to go to [facility] on [address]

## 2025-01-09 NOTE — TELEPHONE ENCOUNTER
Calvin we discussed during his visit. [facility]

## 2025-05-21 ENCOUNTER — APPOINTMENT (OUTPATIENT)
Dept: PRIMARY CARE | Facility: CLINIC | Age: 40
End: 2025-05-21
Payer: COMMERCIAL